# Patient Record
Sex: MALE | Race: WHITE | NOT HISPANIC OR LATINO | Employment: OTHER | URBAN - NONMETROPOLITAN AREA
[De-identification: names, ages, dates, MRNs, and addresses within clinical notes are randomized per-mention and may not be internally consistent; named-entity substitution may affect disease eponyms.]

---

## 2023-08-28 ENCOUNTER — PATIENT ROUNDING (BHMG ONLY) (OUTPATIENT)
Dept: CARDIOLOGY | Facility: CLINIC | Age: 53
End: 2023-08-28
Payer: COMMERCIAL

## 2023-08-28 ENCOUNTER — OFFICE VISIT (OUTPATIENT)
Dept: CARDIOLOGY | Facility: CLINIC | Age: 53
End: 2023-08-28
Payer: COMMERCIAL

## 2023-08-28 VITALS
BODY MASS INDEX: 40 KG/M2 | SYSTOLIC BLOOD PRESSURE: 102 MMHG | HEART RATE: 63 BPM | DIASTOLIC BLOOD PRESSURE: 58 MMHG | HEIGHT: 70 IN | WEIGHT: 279.4 LBS

## 2023-08-28 DIAGNOSIS — Z01.818 PREOPERATIVE CLEARANCE: ICD-10-CM

## 2023-08-28 DIAGNOSIS — Z99.89 OSA ON CPAP: ICD-10-CM

## 2023-08-28 DIAGNOSIS — I10 PRIMARY HYPERTENSION: Primary | ICD-10-CM

## 2023-08-28 DIAGNOSIS — R06.02 SHORTNESS OF BREATH: ICD-10-CM

## 2023-08-28 DIAGNOSIS — F17.200 SMOKING: ICD-10-CM

## 2023-08-28 DIAGNOSIS — R42 DIZZINESS: ICD-10-CM

## 2023-08-28 DIAGNOSIS — E11.69 TYPE 2 DIABETES MELLITUS WITH OTHER SPECIFIED COMPLICATION, WITHOUT LONG-TERM CURRENT USE OF INSULIN: ICD-10-CM

## 2023-08-28 DIAGNOSIS — Z82.49 FAMILY HISTORY OF PREMATURE CORONARY HEART DISEASE: ICD-10-CM

## 2023-08-28 DIAGNOSIS — E78.00 HYPERCHOLESTEROLEMIA: ICD-10-CM

## 2023-08-28 DIAGNOSIS — G47.33 OSA ON CPAP: ICD-10-CM

## 2023-08-28 DIAGNOSIS — R53.83 OTHER FATIGUE: ICD-10-CM

## 2023-08-28 PROBLEM — E11.9 DIABETES MELLITUS: Status: ACTIVE | Noted: 2023-08-28

## 2023-08-28 RX ORDER — ASPIRIN 325 MG
325 TABLET, DELAYED RELEASE (ENTERIC COATED) ORAL DAILY
COMMUNITY

## 2023-08-28 RX ORDER — ROPINIROLE 1 MG/1
1 TABLET, FILM COATED ORAL NIGHTLY
COMMUNITY

## 2023-08-28 RX ORDER — GLIPIZIDE 10 MG/1
10 TABLET ORAL
COMMUNITY

## 2023-08-28 RX ORDER — LOSARTAN POTASSIUM AND HYDROCHLOROTHIAZIDE 12.5; 1 MG/1; MG/1
1 TABLET ORAL DAILY
COMMUNITY

## 2023-08-28 RX ORDER — SIMVASTATIN 20 MG
20 TABLET ORAL NIGHTLY
COMMUNITY

## 2023-08-28 RX ORDER — TIRZEPATIDE 2.5 MG/.5ML
2.5 INJECTION, SOLUTION SUBCUTANEOUS WEEKLY
COMMUNITY

## 2023-08-28 NOTE — PROGRESS NOTES
August 28, 2023    [facility] may [facility] speak with Fercho Mas?    My name is Roxane Turner    I am  with [facility]  [facility]  [address] [place] [address] [phone].    Before we get started may I verify your date of birth? 1970    I am calling to officially welcome you to our practice and ask about your recent visit. Is this a good time to talk? yes    Tell me about your visit with us. What things went well?  everything was excellent       We're always looking for ways to make our patients' experiences even better. Do you have recommendations on ways we may improve?  no everything was good     Overall were you satisfied with your first visit to our practice? Yes ma'am       I appreciate you taking the time to speak with me today. Is there anything else I can do for you? No       Thank you, and have a great day.

## 2023-08-28 NOTE — PROGRESS NOTES
Subjective   Fercho Mas is a 52 y.o. male seen in the office today for initial cardiac consultation secondary to chest pain.     Recent symptoms include right arm pain. One episode of significant right arm pain prompted him to go to emergency department.  He had eaten about 5:00 that day and the pain happened around 11 PM.  He has history of gallbladder problems but surgeon felt that would not have contributed to his symptoms. Cardiac workup with labs and EKG were negative at that time.  Other symptoms include shortness of breath with exertion, episodes of dizziness most often occurring with position change, and significant increase in fatigue without cause.  PMH includes: type 2 diabetes, hypertension, cholesterolemia, COPD, H.pylori, no GI/ issues, umbilical hernia repair, YENI uses CPAP and follows with pulmonologist in [place]. PMH Negative for thyroid issues, CVA, prior cardiac issues.   In regards to diabetic management he started Mounjaro about 2 weeks ago and glucose per home monitor shows better control with glucose between .       FMH: 2 brothers prior MI around age 50, 1 sister MI, father had heart disease/DM  age 84.  Mother had diabetes.    Chief Complaint   Patient presents with    Establish Care     Referred per PCP for chest pain    Chest Pain     Patient had recent episode of pain at right arm and shoulder.    He is anticipating to have gall bladder removed and is need of Cardiac Clearance     Dizziness     Reports having dizziness , usually with bending and fast movement    LABS and TESTING     Labs May 2023 on chart  Has no cardiac testing       Initial cardiac evaluation;      Cardiac History  History reviewed. No pertinent surgical history.    Current Outpatient Medications   Medication Sig Dispense Refill    aspirin 325 MG EC tablet Take 1 tablet by mouth Daily.      Capsicum, Cayenne, (CAYENNE PEPPER PO) Take 400 mg by mouth Daily.      Cholecalciferol (Vitamin D-3) 125 MCG  (5000 UT) tablet Take 1 tablet by mouth Daily.      Cyanocobalamin (B-12 PO) Take 5,000 mg by mouth Daily.      glipizide (GLUCOTROL) 10 MG tablet Take 1 tablet by mouth 2 (Two) Times a Day Before Meals.      losartan-hydrochlorothiazide (HYZAAR) 100-12.5 MG per tablet Take 1 tablet by mouth Daily.      metFORMIN (GLUCOPHAGE) 500 MG tablet Take 2 tablets by mouth 2 (Two) Times a Day With Meals.      METOPROLOL TARTRATE PO Take 1 tablet by mouth 2 (Two) Times a Day.      Omega 3-6-9 Fatty Acids (OMEGA 3-6-9 PO) Take 2 capsules by mouth Daily.      Prasterone, DHEA, (DHEA PO) Take 150 mg by mouth Daily.      rOPINIRole (REQUIP) 1 MG tablet Take 1 tablet by mouth Every Night. Take 1 hour before bedtime.      simvastatin (ZOCOR) 20 MG tablet Take 1 tablet by mouth Every Night.      Tirzepatide (Mounjaro) 2.5 MG/0.5ML solution pen-injector Inject 0.5 mL under the skin into the appropriate area as directed 1 (One) Time Per Week.       No current facility-administered medications for this visit.       Patient has no known allergies.    Past Medical History:   Diagnosis Date    COPD (chronic obstructive pulmonary disease)     Deep vein thrombosis     Diabetes mellitus     Gall stones 2023    Hernia, abdominal     Hyperlipidemia     Hypertension     Restless legs     Sleep apnea        Social History     Socioeconomic History    Marital status:    Tobacco Use    Smoking status: Every Day     Packs/day: 2.00     Years: 44.00     Pack years: 88.00     Types: Cigarettes    Smokeless tobacco: Never   Vaping Use    Vaping Use: Never used   Substance and Sexual Activity    Alcohol use: Never    Drug use: Never       Family History   Problem Relation Age of Onset    Hypertension Father     Heart attack Father     Heart failure Father     Diabetes Father     Heart attack Sister     Diabetes Sister     Diabetes Brother     Heart attack Brother     Diabetes Brother     Heart attack Brother     Diabetes Paternal Grandmother   "      Review of Systems   Constitutional:  Positive for fatigue. Negative for activity change, diaphoresis and fever.   HENT: Negative.     Respiratory:  Positive for apnea (Uses CPAP with room air for YENI management) and shortness of breath.    Cardiovascular: Negative.    Gastrointestinal:  Negative for abdominal pain, blood in stool, constipation, diarrhea and nausea.        At times has issues with gallbladder, none in the last couple weeks   Endocrine: Positive for heat intolerance. Negative for polydipsia, polyphagia and polyuria.   Genitourinary: Negative.  Negative for difficulty urinating and hematuria.   Musculoskeletal:  Positive for arthralgias. Negative for gait problem and myalgias.   Skin: Negative.    Neurological:  Positive for dizziness. Negative for tremors, seizures, syncope, speech difficulty, weakness, light-headedness, numbness and headaches.   Hematological: Negative.    Psychiatric/Behavioral: Negative.       BP Readings from Last 5 Encounters:   08/28/23 102/58       Wt Readings from Last 5 Encounters:   08/28/23 127 kg (279 lb 6.4 oz)          Objective     Labs 5/11/2023: CK-MB 7.4, glucose 143, BUN 19, creatinine 1.28, GFR 59, sodium 139, potassium 4, chloride 108,, toxo 25, calcium 9.2, total protein 7.4, Beman 4.2, total bili 0.5, AST 23, ALT 31, ALP 52, A1c 8.2    Labs 1/9/2023: Total cholesterol 183, triglycerides 109, HDL 33,     /58 (BP Location: Right arm, Patient Position: Sitting)   Pulse 63   Ht 177.8 cm (70\")   Wt 127 kg (279 lb 6.4 oz)   BMI 40.09 kg/mý     Physical Exam  Constitutional:       Appearance: Normal appearance. He is obese.   HENT:      Head: Normocephalic.      Nose: Nose normal.      Mouth/Throat:      Pharynx: Oropharynx is clear.   Eyes:      Conjunctiva/sclera: Conjunctivae normal.      Pupils: Pupils are equal, round, and reactive to light.   Neck:      Vascular: No carotid bruit.   Cardiovascular:      Rate and Rhythm: Normal rate and " regular rhythm.   Pulmonary:      Effort: Pulmonary effort is normal.      Breath sounds: Decreased breath sounds present.   Chest:      Chest wall: No tenderness.   Abdominal:      General: There is no distension.      Palpations: Abdomen is soft.      Tenderness: There is no abdominal tenderness.   Musculoskeletal:      Cervical back: Neck supple. No tenderness.   Neurological:      Mental Status: He is alert.      Gait: Gait is intact.   Psychiatric:         Attention and Perception: Attention normal.         Mood and Affect: Mood normal.         Speech: Speech normal.         Cognition and Memory: Cognition normal.         ECG 12 Lead    Date/Time: 8/28/2023 11:16 AM  Performed by: Loida Russell APRN  Authorized by: Loida Russell APRN   Comparison: compared with previous ECG   Rhythm: sinus rhythm  BPM: 65        Assessment & Plan     Diagnoses and all orders for this visit:    1. Primary hypertension (Primary)  -     Stress Test With Myocardial Perfusion One Day; Future  -     Adult Transthoracic Echo Complete W/ Cont if Necessary Per Protocol; Future    2. Smoking  -     Stress Test With Myocardial Perfusion One Day; Future  -     Adult Transthoracic Echo Complete W/ Cont if Necessary Per Protocol; Future  -     US Carotid Bilateral; Future    3. Hypercholesterolemia  -     Stress Test With Myocardial Perfusion One Day; Future  -     Adult Transthoracic Echo Complete W/ Cont if Necessary Per Protocol; Future  -     US Carotid Bilateral; Future    4. Type 2 diabetes mellitus with other specified complication, without long-term current use of insulin  -     Stress Test With Myocardial Perfusion One Day; Future  -     Adult Transthoracic Echo Complete W/ Cont if Necessary Per Protocol; Future    5. Other fatigue  -     Stress Test With Myocardial Perfusion One Day; Future  -     Adult Transthoracic Echo Complete W/ Cont if Necessary Per Protocol; Future    6. Shortness of breath  -     Stress Test With  Myocardial Perfusion One Day; Future  -     Adult Transthoracic Echo Complete W/ Cont if Necessary Per Protocol; Future    7. Dizziness  -     US Carotid Bilateral; Future    8. YENI on CPAP  -     Stress Test With Myocardial Perfusion One Day; Future  -     Adult Transthoracic Echo Complete W/ Cont if Necessary Per Protocol; Future    9. Family history of premature coronary heart disease    10. Preoperative clearance    Other orders  -     ECG 12 Lead      Hypercholesterolemia  -HDL low,   -CK slightly elevated 7.4  -Continue simvastatin 20 mg nightly  -Consider changing to PCSK9 inhibitor or leqvio    Hypertension  -BP stable, heart rate and rhythm normal  -Electrolytes and renal function stable  -Continue Lopressor, Hyzaar    Diabetes  -A1c 8.2  -Recently started on Mounjaro and will follow with you for monitoring/management    YENI/COPD/smoking  -Compliant with CPAP  -We discussed smoking cessation for over 3 minutes.  Informational handout provided.  He may benefit in taking Wellbutrin.  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    Patient presents today with symptoms concerning for coronary ischemia including shortness of breath, fatigue, dizziness.  He has strong family history of premature ischemic heart disease.  He has significant risk factors including long-term smoking, hypercholesterolemia, hypertension, obesity, diabetes.  Due to knee pain and COPD we will proceed with nuclear stress test using Lexiscan as he does not feel he would be able to reach target heart rate on treadmill.  To look at overall cardiac status, valvular structure, PA pressure and echocardiogram also ordered.    Patient also anticipates need for cardiac clearance prior to cholecystectomy.  Further recommendations based on cardiac work-up results.    6-month follow-up visit scheduled.

## 2023-08-28 NOTE — LETTER
2023     MAMIE Romero  [address] [place] [address]    Patient: Fercho Mas   YOB: 1970   Date of Visit: 2023     Dear MAMIE Romero:       Thank you for referring Fercho Mas to me for evaluation. Below are the relevant portions of my assessment and plan of care.    If you have questions, please do not hesitate to call me. I look forward to following Fercho along with you.         Sincerely,        MAMIE Pimentel        CC: No Recipients    Loida Russell APRN  [phone]  Sign when Signing Visit  Subjective   Fercho Mas is a 52 y.o. male seen in the office today for initial cardiac consultation secondary to chest pain.     Recent symptoms include right arm pain. One episode of significant right arm pain prompted him to go to emergency department.  He had eaten about 5:00 that day and the pain happened around 11 PM.  He has history of gallbladder problems but surgeon felt that would not have contributed to his symptoms. Cardiac workup with labs and EKG were negative at that time.  Other symptoms include shortness of breath with exertion, episodes of dizziness most often occurring with position change, and significant increase in fatigue without cause.  PMH includes: type 2 diabetes, hypertension, cholesterolemia, COPD, H.pylori, no GI/ issues, umbilical hernia repair, YENI uses CPAP and follows with pulmonologist in [place]. PMH Negative for thyroid issues, CVA, prior cardiac issues.   In regards to diabetic management he started Mounjaro about 2 weeks ago and glucose per home monitor shows better control with glucose between .       FMH: 2 brothers prior MI around age 50, 1 sister MI, father had heart disease/DM  age 84.  Mother had diabetes.    Chief Complaint   Patient presents with    Establish Care     Referred per PCP for chest pain    Chest Pain     Patient had recent episode of pain at right arm and shoulder.    He  is anticipating to have gall bladder removed and is need of Cardiac Clearance     Dizziness     Reports having dizziness , usually with bending and fast movement    LABS and TESTING     Labs May 2023 on chart  Has no cardiac testing       Initial cardiac evaluation;      Cardiac History  History reviewed. No pertinent surgical history.    Current Outpatient Medications   Medication Sig Dispense Refill    aspirin 325 MG EC tablet Take 1 tablet by mouth Daily.      Capsicum, Cayenne, (CAYENNE PEPPER PO) Take 400 mg by mouth Daily.      Cholecalciferol (Vitamin D-3) 125 MCG (5000 UT) tablet Take 1 tablet by mouth Daily.      Cyanocobalamin (B-12 PO) Take 5,000 mg by mouth Daily.      glipizide (GLUCOTROL) 10 MG tablet Take 1 tablet by mouth 2 (Two) Times a Day Before Meals.      losartan-hydrochlorothiazide (HYZAAR) 100-12.5 MG per tablet Take 1 tablet by mouth Daily.      metFORMIN (GLUCOPHAGE) 500 MG tablet Take 2 tablets by mouth 2 (Two) Times a Day With Meals.      METOPROLOL TARTRATE PO Take 1 tablet by mouth 2 (Two) Times a Day.      Omega 3-6-9 Fatty Acids (OMEGA 3-6-9 PO) Take 2 capsules by mouth Daily.      Prasterone, DHEA, (DHEA PO) Take 150 mg by mouth Daily.      rOPINIRole (REQUIP) 1 MG tablet Take 1 tablet by mouth Every Night. Take 1 hour before bedtime.      simvastatin (ZOCOR) 20 MG tablet Take 1 tablet by mouth Every Night.      Tirzepatide (Mounjaro) 2.5 MG/0.5ML solution pen-injector Inject 0.5 mL under the skin into the appropriate area as directed 1 (One) Time Per Week.       No current facility-administered medications for this visit.       Patient has no known allergies.    Past Medical History:   Diagnosis Date    COPD (chronic obstructive pulmonary disease)     Deep vein thrombosis     Diabetes mellitus     Gall stones 2023    Hernia, abdominal     Hyperlipidemia     Hypertension     Restless legs     Sleep apnea        Social History     Socioeconomic History     Marital status:    Tobacco Use    Smoking status: Every Day     Packs/day: 2.00     Years: 44.00     Pack years: 88.00     Types: Cigarettes    Smokeless tobacco: Never   Vaping Use    Vaping Use: Never used   Substance and Sexual Activity    Alcohol use: Never    Drug use: Never       Family History   Problem Relation Age of Onset    Hypertension Father     Heart attack Father     Heart failure Father     Diabetes Father     Heart attack Sister     Diabetes Sister     Diabetes Brother     Heart attack Brother     Diabetes Brother     Heart attack Brother     Diabetes Paternal Grandmother        Review of Systems   Constitutional:  Positive for fatigue. Negative for activity change, diaphoresis and fever.   HENT: Negative.     Respiratory:  Positive for apnea (Uses CPAP with room air for YENI management) and shortness of breath.    Cardiovascular: Negative.    Gastrointestinal:  Negative for abdominal pain, blood in stool, constipation, diarrhea and nausea.        At times has issues with gallbladder, none in the last couple weeks   Endocrine: Positive for heat intolerance. Negative for polydipsia, polyphagia and polyuria.   Genitourinary: Negative.  Negative for difficulty urinating and hematuria.   Musculoskeletal:  Positive for arthralgias. Negative for gait problem and myalgias.   Skin: Negative.    Neurological:  Positive for dizziness. Negative for tremors, seizures, syncope, speech difficulty, weakness, light-headedness, numbness and headaches.   Hematological: Negative.    Psychiatric/Behavioral: Negative.       BP Readings from Last 5 Encounters:   08/28/23 102/58       Wt Readings from Last 5 Encounters:   08/28/23 127 kg (279 lb 6.4 oz)          Objective     Labs 5/11/2023: CK-MB 7.4, glucose 143, BUN 19, creatinine 1.28, GFR 59, sodium 139, potassium 4, chloride 108,, toxo 25, calcium 9.2, total protein 7.4, Beman 4.2, total bili 0.5, AST 23, ALT 31, ALP 52, A1c 8.2    Labs  "1/9/2023: Total cholesterol 183, triglycerides 109, HDL 33,     /58 (BP Location: Right arm, Patient Position: Sitting)   Pulse 63   Ht 177.8 cm (70\")   Wt 127 kg (279 lb 6.4 oz)   BMI 40.09 kg/mý     Physical Exam  Constitutional:       Appearance: Normal appearance. He is obese.   HENT:      Head: Normocephalic.      Nose: Nose normal.      Mouth/Throat:      Pharynx: Oropharynx is clear.   Eyes:      Conjunctiva/sclera: Conjunctivae normal.      Pupils: Pupils are equal, round, and reactive to light.   Neck:      Vascular: No carotid bruit.   Cardiovascular:      Rate and Rhythm: Normal rate and regular rhythm.   Pulmonary:      Effort: Pulmonary effort is normal.      Breath sounds: Decreased breath sounds present.   Chest:      Chest wall: No tenderness.   Abdominal:      General: There is no distension.      Palpations: Abdomen is soft.      Tenderness: There is no abdominal tenderness.   Musculoskeletal:      Cervical back: Neck supple. No tenderness.   Neurological:      Mental Status: He is alert.      Gait: Gait is intact.   Psychiatric:         Attention and Perception: Attention normal.         Mood and Affect: Mood normal.         Speech: Speech normal.         Cognition and Memory: Cognition normal.         ECG 12 Lead    Date/Time: 8/28/2023 11:16 AM  Performed by: Loida Russell APRN  Authorized by: Loida Russell APRN   Comparison: compared with previous ECG   Rhythm: sinus rhythm  BPM: 65        Assessment & Plan     Diagnoses and all orders for this visit:    1. Primary hypertension (Primary)  -     Stress Test With Myocardial Perfusion One Day; Future  -     Adult Transthoracic Echo Complete W/ Cont if Necessary Per Protocol; Future    2. Smoking  -     Stress Test With Myocardial Perfusion One Day; Future  -     Adult Transthoracic Echo Complete W/ Cont if Necessary Per Protocol; Future  -     US Carotid Bilateral; Future    3. Hypercholesterolemia  -     Stress Test With " Myocardial Perfusion One Day; Future  -     Adult Transthoracic Echo Complete W/ Cont if Necessary Per Protocol; Future  -     US Carotid Bilateral; Future    4. Type 2 diabetes mellitus with other specified complication, without long-term current use of insulin  -     Stress Test With Myocardial Perfusion One Day; Future  -     Adult Transthoracic Echo Complete W/ Cont if Necessary Per Protocol; Future    5. Other fatigue  -     Stress Test With Myocardial Perfusion One Day; Future  -     Adult Transthoracic Echo Complete W/ Cont if Necessary Per Protocol; Future    6. Shortness of breath  -     Stress Test With Myocardial Perfusion One Day; Future  -     Adult Transthoracic Echo Complete W/ Cont if Necessary Per Protocol; Future    7. Dizziness  -     US Carotid Bilateral; Future    8. YENI on CPAP  -     Stress Test With Myocardial Perfusion One Day; Future  -     Adult Transthoracic Echo Complete W/ Cont if Necessary Per Protocol; Future    9. Family history of premature coronary heart disease    10. Preoperative clearance    Other orders  -     ECG 12 Lead      Hypercholesterolemia  -HDL low,   -CK slightly elevated 7.4  -Continue simvastatin 20 mg nightly  -Consider changing to PCSK9 inhibitor or leqvio    Hypertension  -BP stable, heart rate and rhythm normal  -Electrolytes and renal function stable  -Continue Lopressor, Hyzaar    Diabetes  -A1c 8.2  -Recently started on Mounjaro and will follow with you for monitoring/management    YENI/COPD/smoking  -Compliant with CPAP  -We discussed smoking cessation for over 3 minutes.  Informational handout provided.  He may benefit in taking Wellbutrin.  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    Patient presents today with symptoms concerning for coronary ischemia including shortness of breath, fatigue, dizziness.  He has strong family history of premature ischemic heart disease.  He has significant  risk factors including long-term smoking, hypercholesterolemia, hypertension, obesity, diabetes.  Due to knee pain and COPD we will proceed with nuclear stress test using Lexiscan as he does not feel he would be able to reach target heart rate on treadmill.  To look at overall cardiac status, valvular structure, PA pressure and echocardiogram also ordered.    Patient also anticipates need for cardiac clearance prior to cholecystectomy.  Further recommendations based on cardiac work-up results.    6-month follow-up visit scheduled.

## 2023-09-14 ENCOUNTER — HOSPITAL ENCOUNTER (OUTPATIENT)
Dept: CARDIOLOGY | Facility: HOSPITAL | Age: 53
Discharge: HOME | End: 2023-09-14
Payer: COMMERCIAL

## 2023-09-14 DIAGNOSIS — R53.83 OTHER FATIGUE: ICD-10-CM

## 2023-09-14 DIAGNOSIS — G47.33 OSA ON CPAP: ICD-10-CM

## 2023-09-14 DIAGNOSIS — E78.00 HYPERCHOLESTEROLEMIA: ICD-10-CM

## 2023-09-14 DIAGNOSIS — F17.200 SMOKING: ICD-10-CM

## 2023-09-14 DIAGNOSIS — I10 PRIMARY HYPERTENSION: ICD-10-CM

## 2023-09-14 DIAGNOSIS — R06.02 SHORTNESS OF BREATH: ICD-10-CM

## 2023-09-14 DIAGNOSIS — E11.69 TYPE 2 DIABETES MELLITUS WITH OTHER SPECIFIED COMPLICATION, WITHOUT LONG-TERM CURRENT USE OF INSULIN: ICD-10-CM

## 2023-09-14 DIAGNOSIS — R94.39 ABNORMAL STRESS TEST: Primary | ICD-10-CM

## 2023-09-14 DIAGNOSIS — Z99.89 OSA ON CPAP: ICD-10-CM

## 2023-09-14 DIAGNOSIS — R42 DIZZINESS: ICD-10-CM

## 2023-09-14 LAB
BH CV ECHO MEAS - ACS: 2.5 CM
BH CV ECHO MEAS - AO MAX PG: 8.6 MMHG
BH CV ECHO MEAS - AO MEAN PG: 5 MMHG
BH CV ECHO MEAS - AO ROOT DIAM: 3.2 CM
BH CV ECHO MEAS - AO V2 MAX: 146.8 CM/SEC
BH CV ECHO MEAS - AO V2 VTI: 32.9 CM
BH CV ECHO MEAS - AVA(I,D): 2.9 CM2
BH CV ECHO MEAS - EDV(CUBED): 169.1 ML
BH CV ECHO MEAS - EDV(MOD-SP4): 121 ML
BH CV ECHO MEAS - EF(MOD-SP4): 50.2 %
BH CV ECHO MEAS - EF_3D-VOL: 53 %
BH CV ECHO MEAS - ESV(CUBED): 54 ML
BH CV ECHO MEAS - ESV(MOD-SP4): 60.3 ML
BH CV ECHO MEAS - FS: 31.6 %
BH CV ECHO MEAS - IVS/LVPW: 1.01 CM
BH CV ECHO MEAS - IVSD: 1.35 CM
BH CV ECHO MEAS - LA DIMENSION: 3.9 CM
BH CV ECHO MEAS - LAT PEAK E' VEL: 10.6 CM/SEC
BH CV ECHO MEAS - LV DIASTOLIC VOL/BSA (35-75): 50.3 CM2
BH CV ECHO MEAS - LV MASS(C)D: 322 GRAMS
BH CV ECHO MEAS - LV MAX PG: 2.41 MMHG
BH CV ECHO MEAS - LV MEAN PG: 1 MMHG
BH CV ECHO MEAS - LV SYSTOLIC VOL/BSA (12-30): 25.1 CM2
BH CV ECHO MEAS - LV V1 MAX: 77.6 CM/SEC
BH CV ECHO MEAS - LV V1 VTI: 16.5 CM
BH CV ECHO MEAS - LVIDD: 5.5 CM
BH CV ECHO MEAS - LVIDS: 3.8 CM
BH CV ECHO MEAS - LVOT AREA: 5.7 CM2
BH CV ECHO MEAS - LVOT DIAM: 2.7 CM
BH CV ECHO MEAS - LVPWD: 1.34 CM
BH CV ECHO MEAS - MED PEAK E' VEL: 7.4 CM/SEC
BH CV ECHO MEAS - MV A MAX VEL: 73.7 CM/SEC
BH CV ECHO MEAS - MV DEC TIME: 0.17 MSEC
BH CV ECHO MEAS - MV E MAX VEL: 98.1 CM/SEC
BH CV ECHO MEAS - MV E/A: 1.33
BH CV ECHO MEAS - SI(MOD-SP4): 25.2 ML/M2
BH CV ECHO MEAS - SV(LVOT): 94.5 ML
BH CV ECHO MEAS - SV(MOD-SP4): 60.7 ML
BH CV ECHO MEASUREMENTS AVERAGE E/E' RATIO: 10.9
BH CV REST NUCLEAR ISOTOPE DOSE: 10 MCI
BH CV STRESS NUCLEAR ISOTOPE DOSE: 30 MCI
BH CV XLRA - RV BASE: 3.6 CM
BH CV XLRA - RV LENGTH: 9.2 CM
BH CV XLRA - RV MID: 3.6 CM
LEFT ATRIUM VOLUME INDEX: 24.3 ML/M2
LV EF NUC BP: 46 %

## 2023-09-14 PROCEDURE — 0 TECHNETIUM SESTAMIBI: Performed by: INTERNAL MEDICINE

## 2023-09-14 PROCEDURE — 93017 CV STRESS TEST TRACING ONLY: CPT

## 2023-09-14 PROCEDURE — 93306 TTE W/DOPPLER COMPLETE: CPT

## 2023-09-14 PROCEDURE — 93880 EXTRACRANIAL BILAT STUDY: CPT

## 2023-09-14 PROCEDURE — A9500 TC99M SESTAMIBI: HCPCS | Performed by: INTERNAL MEDICINE

## 2023-09-14 PROCEDURE — 25010000002 REGADENOSON 0.4 MG/5ML SOLUTION: Performed by: INTERNAL MEDICINE

## 2023-09-14 PROCEDURE — 78452 HT MUSCLE IMAGE SPECT MULT: CPT

## 2023-09-14 RX ORDER — REGADENOSON 0.08 MG/ML
0.4 INJECTION, SOLUTION INTRAVENOUS
Status: COMPLETED | OUTPATIENT
Start: 2023-09-14 | End: 2023-09-14

## 2023-09-14 RX ORDER — ISOSORBIDE MONONITRATE 30 MG/1
30 TABLET, EXTENDED RELEASE ORAL EVERY MORNING
Qty: 30 TABLET | Refills: 11 | Status: SHIPPED | OUTPATIENT
Start: 2023-09-14

## 2023-09-14 RX ADMIN — REGADENOSON 0.4 MG: 0.08 INJECTION, SOLUTION INTRAVENOUS at 10:43

## 2023-09-14 RX ADMIN — TECHNETIUM TC 99M SESTAMIBI 1 DOSE: 1 INJECTION INTRAVENOUS at 09:01

## 2023-09-14 RX ADMIN — TECHNETIUM TC 99M SESTAMIBI 1 DOSE: 1 INJECTION INTRAVENOUS at 10:43

## 2023-09-20 ENCOUNTER — TELEPHONE (OUTPATIENT)
Dept: CARDIOLOGY | Facility: CLINIC | Age: 53
End: 2023-09-20
Payer: COMMERCIAL

## 2023-09-20 NOTE — TELEPHONE ENCOUNTER
I am working on scheduling patient's cardiac catheterization on 10/12/2023. He completely quit smoking 3 days ago.    He is requesting his cath be done radial to have shorter down time due to work.

## 2023-10-12 ENCOUNTER — OUTSIDE FACILITY SERVICE (OUTPATIENT)
Dept: CARDIOLOGY | Facility: CLINIC | Age: 53
End: 2023-10-12
Payer: COMMERCIAL

## 2023-10-12 PROCEDURE — 93458 L HRT ARTERY/VENTRICLE ANGIO: CPT | Performed by: INTERNAL MEDICINE

## 2023-11-08 ENCOUNTER — TELEPHONE (OUTPATIENT)
Dept: CARDIOLOGY | Facility: CLINIC | Age: 53
End: 2023-11-08
Payer: COMMERCIAL

## 2023-11-08 NOTE — TELEPHONE ENCOUNTER
Received fax from Dr. Thapa for cardiac clearance for patient to have a lap jaci. Patient is on aspirin, unclear if needing to hold. According to our records, I do not see where patient has had any stenting.          Fax [phone]

## 2023-12-07 ENCOUNTER — OFFICE VISIT (OUTPATIENT)
Dept: CARDIOLOGY | Facility: CLINIC | Age: 53
End: 2023-12-07
Payer: COMMERCIAL

## 2023-12-07 VITALS
HEART RATE: 78 BPM | WEIGHT: 276 LBS | DIASTOLIC BLOOD PRESSURE: 64 MMHG | SYSTOLIC BLOOD PRESSURE: 104 MMHG | BODY MASS INDEX: 39.51 KG/M2 | HEIGHT: 70 IN

## 2023-12-07 DIAGNOSIS — I10 PRIMARY HYPERTENSION: Primary | ICD-10-CM

## 2023-12-07 DIAGNOSIS — F17.200 SMOKING: ICD-10-CM

## 2023-12-07 DIAGNOSIS — I42.0 CARDIOMYOPATHY, DILATED: ICD-10-CM

## 2023-12-07 DIAGNOSIS — E11.9 TYPE 2 DIABETES MELLITUS WITHOUT COMPLICATION, WITHOUT LONG-TERM CURRENT USE OF INSULIN: ICD-10-CM

## 2023-12-07 DIAGNOSIS — E66.01 MORBID OBESITY WITH BMI OF 40.0-44.9, ADULT: ICD-10-CM

## 2023-12-07 DIAGNOSIS — E78.00 HYPERCHOLESTEROLEMIA: ICD-10-CM

## 2023-12-07 DIAGNOSIS — I51.9 SYSTOLIC DYSFUNCTION: ICD-10-CM

## 2023-12-07 RX ORDER — ASPIRIN 81 MG/1
81 TABLET ORAL DAILY
Qty: 90 TABLET | Refills: 3 | Status: SHIPPED | OUTPATIENT
Start: 2023-12-07

## 2023-12-07 RX ORDER — SPIRONOLACTONE 25 MG/1
1 TABLET ORAL DAILY
COMMUNITY
Start: 2023-11-20 | End: 2023-12-07 | Stop reason: SDUPTHER

## 2023-12-07 RX ORDER — METOPROLOL TARTRATE 50 MG/1
50 TABLET, FILM COATED ORAL 2 TIMES DAILY
Qty: 180 TABLET | Refills: 3 | Status: SHIPPED | OUTPATIENT
Start: 2023-12-07

## 2023-12-07 RX ORDER — SPIRONOLACTONE 25 MG/1
25 TABLET ORAL DAILY
Qty: 90 TABLET | Refills: 3 | Status: SHIPPED | OUTPATIENT
Start: 2023-12-07

## 2023-12-07 RX ORDER — SACUBITRIL AND VALSARTAN 49; 51 MG/1; MG/1
1 TABLET, FILM COATED ORAL EVERY 12 HOURS SCHEDULED
Qty: 180 TABLET | Refills: 3 | Status: SHIPPED | OUTPATIENT
Start: 2023-12-07

## 2023-12-07 RX ORDER — SACUBITRIL AND VALSARTAN 49; 51 MG/1; MG/1
1 TABLET, FILM COATED ORAL EVERY 12 HOURS SCHEDULED
COMMUNITY
Start: 2023-11-20 | End: 2023-12-07 | Stop reason: SDUPTHER

## 2023-12-07 RX ORDER — VENLAFAXINE 50 MG/1
50 TABLET ORAL 2 TIMES DAILY WITH MEALS
COMMUNITY
Start: 2023-11-17

## 2023-12-07 RX ORDER — ROSUVASTATIN CALCIUM 20 MG/1
1 TABLET, COATED ORAL DAILY
COMMUNITY
Start: 2023-11-20 | End: 2023-12-07 | Stop reason: SDUPTHER

## 2023-12-07 RX ORDER — ROSUVASTATIN CALCIUM 20 MG/1
20 TABLET, COATED ORAL DAILY
Qty: 90 TABLET | Refills: 3 | Status: SHIPPED | OUTPATIENT
Start: 2023-12-07

## 2023-12-07 NOTE — LETTER
December 7, 2023       No Recipients    Patient: Fercho Mas   YOB: 1970   Date of Visit: 12/7/2023     Dear MAMIE Romero:       Thank you for referring Fercho Mas to me for evaluation. Below are the relevant portions of my assessment and plan of care.    If you have questions, please do not hesitate to call me. I look forward to following Fercho along with you.         Sincerely,        Miki Padron MD        CC:   No Recipients    Miki Padron MD  12/07/23 1258  Sign when Signing Visit  Chief Complaint   Patient presents with   • Follow-up     Pt is here for cardiac/hospital  follow up.  Pt states he is feeling better after medication changes and his cath.  He denies CP, SOB, dizziness or palpitations at this time.  Pt does take a daily ASA.     • Med Refill     Pt request 90 day refills to be sent to [facility] #2.  Medications were verified by the pt.     • Lab Work     Pt states their last labs were 10/12/23 at [facility].        CARDIAC COMPLAINTS  dyspnea        Subjective  Fercho Mas is a 53 y.o. male came in today for his hospital follow-up visit.  He has history of hypertension, diabetes, hypercholesterolemia who was referred for chest pain and shortness of breath.  He underwent an echocardiogram which shows left ventricular function was slightly diminished and wall motion abnormality involving the apex and the septum.  His stress test shows apical infarct with ischemia.  He is carotid ultrasound showed no significant stenosis.  He then underwent cardiac catheterization which showed normal coronaries, mild LV dysfunction.  He had some changes with the medication.  Hyzaar was changed to Entresto.  Aldactone was added.  He also was told to use his CPAP more regularly.  He came today stating that he is feeling better than before.  His shortness of breath is still there but less than what it was.  He does take his aspirin regularly.  His blood sugars still running  little high.              Cardiac History  Past Surgical History:   Procedure Laterality Date   • CARDIAC CATHETERIZATION  10/12/2023    Normal Coronaries. EF 45%   • CARDIOVASCULAR STRESS TEST  09/14/2023    Lexiscan- EF 46%. Apical Infarct with Ischemia   • ECHO - CONVERTED  09/14/2023    TLS. EF 45-50%. Apical WMA. LA- 3.9. Trace-Mild MR   • US CAROTID UNILATERAL  09/14/2023    No sig lesions       Current Outpatient Medications   Medication Sig Dispense Refill   • Capsicum, Cayenne, (CAYENNE PEPPER PO) Take 400 mg by mouth Daily.     • Cholecalciferol (Vitamin D-3) 125 MCG (5000 UT) tablet Take 1 tablet by mouth Daily.     • Cyanocobalamin (B-12 PO) Take 5,000 mg by mouth Daily.     • Entresto 49-51 MG tablet Take 1 tablet by mouth Every 12 (Twelve) Hours. 180 tablet 3   • glipizide (GLUCOTROL) 10 MG tablet Take 1 tablet by mouth 2 (Two) Times a Day Before Meals.     • metFORMIN (GLUCOPHAGE) 500 MG tablet Take 2 tablets by mouth 2 (Two) Times a Day With Meals.     • metoprolol tartrate (LOPRESSOR) 50 MG tablet Take 1 tablet by mouth 2 (Two) Times a Day. 180 tablet 3   • Omega 3-6-9 Fatty Acids (OMEGA 3-6-9 PO) Take 2 capsules by mouth Daily.     • Prasterone, DHEA, (DHEA PO) Take 150 mg by mouth Daily.     • rOPINIRole (REQUIP) 1 MG tablet Take 1 tablet by mouth Every Night. Take 1 hour before bedtime.     • rosuvastatin (CRESTOR) 20 MG tablet Take 1 tablet by mouth Daily. 90 tablet 3   • spironolactone (ALDACTONE) 25 MG tablet Take 1 tablet by mouth Daily. 90 tablet 3   • Tirzepatide (Mounjaro) 2.5 MG/0.5ML solution pen-injector Inject 0.5 mL under the skin into the appropriate area as directed 1 (One) Time Per Week.     • venlafaxine (EFFEXOR) 50 MG tablet Take 1 tablet by mouth 2 (Two) Times a Day With Meals.     • aspirin 81 MG EC tablet Take 1 tablet by mouth Daily. 90 tablet 3   • dapagliflozin Propanediol 10 MG tablet Take 10 mg by mouth Daily. 90 tablet 3     No current facility-administered medications  for this visit.       Allergies  :  Patient has no known allergies.       Past Medical History:   Diagnosis Date   • COPD (chronic obstructive pulmonary disease)    • Deep vein thrombosis    • Diabetes mellitus    • Gall stones 2023   • Hernia, abdominal    • Hyperlipidemia    • Hypertension    • Restless legs    • Sleep apnea        Social History     Socioeconomic History   • Marital status:    Tobacco Use   • Smoking status: Every Day     Packs/day: 1.50     Years: 44.00     Additional pack years: 0.00     Total pack years: 66.00     Types: Cigarettes   • Smokeless tobacco: Never   Vaping Use   • Vaping Use: Never used   Substance and Sexual Activity   • Alcohol use: Never   • Drug use: Never       Family History   Problem Relation Age of Onset   • Hypertension Father    • Heart attack Father    • Heart failure Father    • Diabetes Father    • Heart attack Sister    • Diabetes Sister    • Diabetes Brother    • Heart attack Brother    • Diabetes Brother    • Heart attack Brother    • Diabetes Paternal Grandmother        Review of Systems   Constitutional: Negative for decreased appetite and malaise/fatigue.   HENT:  Negative for congestion and sore throat.    Eyes:  Negative for blurred vision, double vision and visual disturbance.   Cardiovascular:  Positive for dyspnea on exertion. Negative for chest pain.   Respiratory:  Positive for shortness of breath. Negative for snoring.    Endocrine: Negative for cold intolerance and heat intolerance.   Hematologic/Lymphatic: Negative for adenopathy. Does not bruise/bleed easily.   Skin:  Negative for itching, nail changes and skin cancer.   Musculoskeletal:  Negative for arthritis and myalgias.   Gastrointestinal:  Negative for abdominal pain, dysphagia and heartburn.   Genitourinary:  Negative for bladder incontinence and frequency.   Neurological:  Negative for dizziness, seizures and vertigo.   Psychiatric/Behavioral:  Negative for altered mental status.   "  Allergic/Immunologic: Negative for environmental allergies and hives.     Diabetes- Yes  Thyroid- normal    Objective    /64 (BP Location: Left arm, Patient Position: Sitting)   Pulse 78   Ht 177.8 cm (70\")   Wt 125 kg (276 lb)   BMI 39.60 kg/m²     Vitals and nursing note reviewed.   Constitutional:       Appearance: Healthy appearance. Not in distress.   Eyes:      Conjunctiva/sclera: Conjunctivae normal.      Pupils: Pupils are equal, round, and reactive to light.   HENT:      Head: Normocephalic.   Pulmonary:      Effort: Pulmonary effort is normal.      Breath sounds: Normal breath sounds.   Cardiovascular:      PMI at left midclavicular line. Normal rate. Regular rhythm.   Abdominal:      General: Bowel sounds are normal.      Palpations: Abdomen is soft.   Musculoskeletal: Normal range of motion.      Cervical back: Normal range of motion and neck supple. Skin:     General: Skin is warm and dry.   Neurological:      Mental Status: Alert, oriented to person, place, and time and oriented to person, place and time.   Procedures            @ASSESSMENT/PLAN@  Class 2 Severe Obesity (BMI >=35 and <=39.9). Obesity-related health conditions include the following: hypertension, diabetes mellitus, GERD, and lower extremity venous stasis disease. Obesity is unchanged. BMI is is above average; BMI management plan is completed. We discussed low calorie, low carb based diet program, portion control, and increasing exercise.     Diagnoses and all orders for this visit:    1. Primary hypertension (Primary)  -     Entresto 49-51 MG tablet; Take 1 tablet by mouth Every 12 (Twelve) Hours.  Dispense: 180 tablet; Refill: 3  -     metoprolol tartrate (LOPRESSOR) 50 MG tablet; Take 1 tablet by mouth 2 (Two) Times a Day.  Dispense: 180 tablet; Refill: 3  -     spironolactone (ALDACTONE) 25 MG tablet; Take 1 tablet by mouth Daily.  Dispense: 90 tablet; Refill: 3  -     Comprehensive Metabolic Panel; Future    2. " Hypercholesterolemia  -     aspirin 81 MG EC tablet; Take 1 tablet by mouth Daily.  Dispense: 90 tablet; Refill: 3  -     rosuvastatin (CRESTOR) 20 MG tablet; Take 1 tablet by mouth Daily.  Dispense: 90 tablet; Refill: 3  -     Lipid Panel; Future    3. Smoking    4. Systolic dysfunction  -     Entresto 49-51 MG tablet; Take 1 tablet by mouth Every 12 (Twelve) Hours.  Dispense: 180 tablet; Refill: 3  -     dapagliflozin Propanediol 10 MG tablet; Take 10 mg by mouth Daily.  Dispense: 90 tablet; Refill: 3  -     proBNP; Future    5. Cardiomyopathy, dilated  -     aspirin 81 MG EC tablet; Take 1 tablet by mouth Daily.  Dispense: 90 tablet; Refill: 3  -     Entresto 49-51 MG tablet; Take 1 tablet by mouth Every 12 (Twelve) Hours.  Dispense: 180 tablet; Refill: 3  -     metoprolol tartrate (LOPRESSOR) 50 MG tablet; Take 1 tablet by mouth 2 (Two) Times a Day.  Dispense: 180 tablet; Refill: 3  -     spironolactone (ALDACTONE) 25 MG tablet; Take 1 tablet by mouth Daily.  Dispense: 90 tablet; Refill: 3  -     dapagliflozin Propanediol 10 MG tablet; Take 10 mg by mouth Daily.  Dispense: 90 tablet; Refill: 3  -     TSH; Future  -     proBNP; Future    6. Morbid obesity with BMI of 40.0-44.9, adult  -     CBC & Differential; Future    7. Type 2 diabetes mellitus without complication, without long-term current use of insulin  -     dapagliflozin Propanediol 10 MG tablet; Take 10 mg by mouth Daily.  Dispense: 90 tablet; Refill: 3  -     Hemoglobin A1c; Future  -     TSH; Future       At baseline his heart rate is stable.  His blood pressure is normal.  His BMI is still around 40.  His clinical examination is otherwise unremarkable    Regarding his hypertension, it seems to be very well-controlled with a combination of Entresto, beta-blockers and Aldactone.  Continue the same.  He is advised to recheck his lab especially the potassium level and the renal function    Regarding his hypercholesterolemia, his LDL was still slightly  elevated on Crestor dose was increased.  Need to recheck his profile and LFT    Regarding the systolic dysfunction and cardiomyopathy.  At this time we will continue the Entresto, beta-blockers and Aldactone.  I did add Farxiga 10 mg once a day.  He is advised to check his BNP level and if it is elevated will add low-dose of diuretic    Regarding his smoking, talked to him again about the smoking.  He is trying his best to quit smoking.  I gave him papers to help him quit    Regarding the obesity, he does have sleep apnea and is using the CPAP more regularly.  I talked to him about diet and gave him papers on Mediterranean diet    Regarding his diabetes, he is on metformin and Glucotrol.  His blood sugar is still slightly elevated.  Hopefully the Farxiga will help reduce it.  Recheck his A1c level    I will see him back in 6 months or sooner if needed                  Electronically signed by Miki Padron MD December 7, 2023 12:58 EST

## 2023-12-07 NOTE — PROGRESS NOTES
Chief Complaint   Patient presents with   • Follow-up     Pt is here for cardiac/hospital  follow up.  Pt states he is feeling better after medication changes and his cath.  He denies CP, SOB, dizziness or palpitations at this time.  Pt does take a daily ASA.     • Med Refill     Pt request 90 day refills to be sent to [facility] #2.  Medications were verified by the pt.     • Lab Work     Pt states their last labs were 10/12/23 at [facility].        CARDIAC COMPLAINTS  dyspnea        Subjective   Fercho Mas is a 53 y.o. male came in today for his hospital follow-up visit.  He has history of hypertension, diabetes, hypercholesterolemia who was referred for chest pain and shortness of breath.  He underwent an echocardiogram which shows left ventricular function was slightly diminished and wall motion abnormality involving the apex and the septum.  His stress test shows apical infarct with ischemia.  He is carotid ultrasound showed no significant stenosis.  He then underwent cardiac catheterization which showed normal coronaries, mild LV dysfunction.  He had some changes with the medication.  Hyzaar was changed to Entresto.  Aldactone was added.  He also was told to use his CPAP more regularly.  He came today stating that he is feeling better than before.  His shortness of breath is still there but less than what it was.  He does take his aspirin regularly.  His blood sugars still running little high.              Cardiac History  Past Surgical History:   Procedure Laterality Date   • CARDIAC CATHETERIZATION  10/12/2023    Normal Coronaries. EF 45%   • CARDIOVASCULAR STRESS TEST  09/14/2023    Lexiscan- EF 46%. Apical Infarct with Ischemia   • ECHO - CONVERTED  09/14/2023    TLS. EF 45-50%. Apical WMA. LA- 3.9. Trace-Mild MR   • US CAROTID UNILATERAL  09/14/2023    No sig lesions       Current Outpatient Medications   Medication Sig Dispense Refill   • Capsicum, Cayenne, (CAYENNE PEPPER PO) Take 400 mg by mouth  Daily.     • Cholecalciferol (Vitamin D-3) 125 MCG (5000 UT) tablet Take 1 tablet by mouth Daily.     • Cyanocobalamin (B-12 PO) Take 5,000 mg by mouth Daily.     • Entresto 49-51 MG tablet Take 1 tablet by mouth Every 12 (Twelve) Hours. 180 tablet 3   • glipizide (GLUCOTROL) 10 MG tablet Take 1 tablet by mouth 2 (Two) Times a Day Before Meals.     • metFORMIN (GLUCOPHAGE) 500 MG tablet Take 2 tablets by mouth 2 (Two) Times a Day With Meals.     • metoprolol tartrate (LOPRESSOR) 50 MG tablet Take 1 tablet by mouth 2 (Two) Times a Day. 180 tablet 3   • Omega 3-6-9 Fatty Acids (OMEGA 3-6-9 PO) Take 2 capsules by mouth Daily.     • Prasterone, DHEA, (DHEA PO) Take 150 mg by mouth Daily.     • rOPINIRole (REQUIP) 1 MG tablet Take 1 tablet by mouth Every Night. Take 1 hour before bedtime.     • rosuvastatin (CRESTOR) 20 MG tablet Take 1 tablet by mouth Daily. 90 tablet 3   • spironolactone (ALDACTONE) 25 MG tablet Take 1 tablet by mouth Daily. 90 tablet 3   • Tirzepatide (Mounjaro) 2.5 MG/0.5ML solution pen-injector Inject 0.5 mL under the skin into the appropriate area as directed 1 (One) Time Per Week.     • venlafaxine (EFFEXOR) 50 MG tablet Take 1 tablet by mouth 2 (Two) Times a Day With Meals.     • aspirin 81 MG EC tablet Take 1 tablet by mouth Daily. 90 tablet 3   • dapagliflozin Propanediol 10 MG tablet Take 10 mg by mouth Daily. 90 tablet 3     No current facility-administered medications for this visit.       Allergies  :  Patient has no known allergies.       Past Medical History:   Diagnosis Date   • COPD (chronic obstructive pulmonary disease)    • Deep vein thrombosis    • Diabetes mellitus    • Gall stones 2023   • Hernia, abdominal    • Hyperlipidemia    • Hypertension    • Restless legs    • Sleep apnea        Social History     Socioeconomic History   • Marital status:    Tobacco Use   • Smoking status: Every Day     Packs/day: 1.50     Years: 44.00     Additional pack years: 0.00     Total pack  "years: 66.00     Types: Cigarettes   • Smokeless tobacco: Never   Vaping Use   • Vaping Use: Never used   Substance and Sexual Activity   • Alcohol use: Never   • Drug use: Never       Family History   Problem Relation Age of Onset   • Hypertension Father    • Heart attack Father    • Heart failure Father    • Diabetes Father    • Heart attack Sister    • Diabetes Sister    • Diabetes Brother    • Heart attack Brother    • Diabetes Brother    • Heart attack Brother    • Diabetes Paternal Grandmother        Review of Systems   Constitutional: Negative for decreased appetite and malaise/fatigue.   HENT:  Negative for congestion and sore throat.    Eyes:  Negative for blurred vision, double vision and visual disturbance.   Cardiovascular:  Positive for dyspnea on exertion. Negative for chest pain.   Respiratory:  Positive for shortness of breath. Negative for snoring.    Endocrine: Negative for cold intolerance and heat intolerance.   Hematologic/Lymphatic: Negative for adenopathy. Does not bruise/bleed easily.   Skin:  Negative for itching, nail changes and skin cancer.   Musculoskeletal:  Negative for arthritis and myalgias.   Gastrointestinal:  Negative for abdominal pain, dysphagia and heartburn.   Genitourinary:  Negative for bladder incontinence and frequency.   Neurological:  Negative for dizziness, seizures and vertigo.   Psychiatric/Behavioral:  Negative for altered mental status.    Allergic/Immunologic: Negative for environmental allergies and hives.     Diabetes- Yes  Thyroid- normal    Objective     /64 (BP Location: Left arm, Patient Position: Sitting)   Pulse 78   Ht 177.8 cm (70\")   Wt 125 kg (276 lb)   BMI 39.60 kg/m²     Vitals and nursing note reviewed.   Constitutional:       Appearance: Healthy appearance. Not in distress.   Eyes:      Conjunctiva/sclera: Conjunctivae normal.      Pupils: Pupils are equal, round, and reactive to light.   HENT:      Head: Normocephalic.   Pulmonary:      " Effort: Pulmonary effort is normal.      Breath sounds: Normal breath sounds.   Cardiovascular:      PMI at left midclavicular line. Normal rate. Regular rhythm.   Abdominal:      General: Bowel sounds are normal.      Palpations: Abdomen is soft.   Musculoskeletal: Normal range of motion.      Cervical back: Normal range of motion and neck supple. Skin:     General: Skin is warm and dry.   Neurological:      Mental Status: Alert, oriented to person, place, and time and oriented to person, place and time.   Procedures            @ASSESSMENT/PLAN@  Class 2 Severe Obesity (BMI >=35 and <=39.9). Obesity-related health conditions include the following: hypertension, diabetes mellitus, GERD, and lower extremity venous stasis disease. Obesity is unchanged. BMI is is above average; BMI management plan is completed. We discussed low calorie, low carb based diet program, portion control, and increasing exercise.     Diagnoses and all orders for this visit:    1. Primary hypertension (Primary)  -     Entresto 49-51 MG tablet; Take 1 tablet by mouth Every 12 (Twelve) Hours.  Dispense: 180 tablet; Refill: 3  -     metoprolol tartrate (LOPRESSOR) 50 MG tablet; Take 1 tablet by mouth 2 (Two) Times a Day.  Dispense: 180 tablet; Refill: 3  -     spironolactone (ALDACTONE) 25 MG tablet; Take 1 tablet by mouth Daily.  Dispense: 90 tablet; Refill: 3  -     Comprehensive Metabolic Panel; Future    2. Hypercholesterolemia  -     aspirin 81 MG EC tablet; Take 1 tablet by mouth Daily.  Dispense: 90 tablet; Refill: 3  -     rosuvastatin (CRESTOR) 20 MG tablet; Take 1 tablet by mouth Daily.  Dispense: 90 tablet; Refill: 3  -     Lipid Panel; Future    3. Smoking    4. Systolic dysfunction  -     Entresto 49-51 MG tablet; Take 1 tablet by mouth Every 12 (Twelve) Hours.  Dispense: 180 tablet; Refill: 3  -     dapagliflozin Propanediol 10 MG tablet; Take 10 mg by mouth Daily.  Dispense: 90 tablet; Refill: 3  -     proBNP; Future    5.  Cardiomyopathy, dilated  -     aspirin 81 MG EC tablet; Take 1 tablet by mouth Daily.  Dispense: 90 tablet; Refill: 3  -     Entresto 49-51 MG tablet; Take 1 tablet by mouth Every 12 (Twelve) Hours.  Dispense: 180 tablet; Refill: 3  -     metoprolol tartrate (LOPRESSOR) 50 MG tablet; Take 1 tablet by mouth 2 (Two) Times a Day.  Dispense: 180 tablet; Refill: 3  -     spironolactone (ALDACTONE) 25 MG tablet; Take 1 tablet by mouth Daily.  Dispense: 90 tablet; Refill: 3  -     dapagliflozin Propanediol 10 MG tablet; Take 10 mg by mouth Daily.  Dispense: 90 tablet; Refill: 3  -     TSH; Future  -     proBNP; Future    6. Morbid obesity with BMI of 40.0-44.9, adult  -     CBC & Differential; Future    7. Type 2 diabetes mellitus without complication, without long-term current use of insulin  -     dapagliflozin Propanediol 10 MG tablet; Take 10 mg by mouth Daily.  Dispense: 90 tablet; Refill: 3  -     Hemoglobin A1c; Future  -     TSH; Future       At baseline his heart rate is stable.  His blood pressure is normal.  His BMI is still around 40.  His clinical examination is otherwise unremarkable    Regarding his hypertension, it seems to be very well-controlled with a combination of Entresto, beta-blockers and Aldactone.  Continue the same.  He is advised to recheck his lab especially the potassium level and the renal function    Regarding his hypercholesterolemia, his LDL was still slightly elevated on Crestor dose was increased.  Need to recheck his profile and LFT    Regarding the systolic dysfunction and cardiomyopathy.  At this time we will continue the Entresto, beta-blockers and Aldactone.  I did add Farxiga 10 mg once a day.  He is advised to check his BNP level and if it is elevated will add low-dose of diuretic    Regarding his smoking, talked to him again about the smoking.  He is trying his best to quit smoking.  I gave him papers to help him quit    Regarding the obesity, he does have sleep apnea and is  using the CPAP more regularly.  I talked to him about diet and gave him papers on Mediterranean diet    Regarding his diabetes, he is on metformin and Glucotrol.  His blood sugar is still slightly elevated.  Hopefully the Farxiga will help reduce it.  Recheck his A1c level    I will see him back in 6 months or sooner if needed                  Electronically signed by Miki Padron MD December 7, 2023 12:58 EST

## 2024-01-30 ENCOUNTER — TELEPHONE (OUTPATIENT)
Dept: CARDIOLOGY | Facility: CLINIC | Age: 54
End: 2024-01-30
Payer: COMMERCIAL

## 2024-01-30 NOTE — TELEPHONE ENCOUNTER
Received fax from Dr. Thapa for cardiac clearance for patient to have a lap jaci under general anesthesia. Patient is on aspirin, unclear if needing to hold. According to our records, I do not see where patient has had any stenting.          Fax [phone]

## 2024-06-12 ENCOUNTER — OFFICE VISIT (OUTPATIENT)
Dept: CARDIOLOGY | Facility: CLINIC | Age: 54
End: 2024-06-12
Payer: COMMERCIAL

## 2024-06-12 VITALS
HEIGHT: 70 IN | SYSTOLIC BLOOD PRESSURE: 106 MMHG | DIASTOLIC BLOOD PRESSURE: 62 MMHG | WEIGHT: 253.2 LBS | HEART RATE: 72 BPM | BODY MASS INDEX: 36.25 KG/M2

## 2024-06-12 DIAGNOSIS — G47.33 OSA ON CPAP: ICD-10-CM

## 2024-06-12 DIAGNOSIS — I51.9 SYSTOLIC DYSFUNCTION: ICD-10-CM

## 2024-06-12 DIAGNOSIS — F17.200 SMOKING: ICD-10-CM

## 2024-06-12 DIAGNOSIS — I42.0 CARDIOMYOPATHY, DILATED: Primary | ICD-10-CM

## 2024-06-12 DIAGNOSIS — Z79.899 MEDICATION MANAGEMENT: ICD-10-CM

## 2024-06-12 DIAGNOSIS — E78.00 HYPERCHOLESTEROLEMIA: ICD-10-CM

## 2024-06-12 DIAGNOSIS — E11.9 TYPE 2 DIABETES MELLITUS WITHOUT COMPLICATION, WITHOUT LONG-TERM CURRENT USE OF INSULIN: ICD-10-CM

## 2024-06-12 DIAGNOSIS — E66.01 MORBID OBESITY WITH BMI OF 40.0-44.9, ADULT: ICD-10-CM

## 2024-06-12 DIAGNOSIS — I10 PRIMARY HYPERTENSION: ICD-10-CM

## 2024-06-12 NOTE — PROGRESS NOTES
Chief Complaint   Patient presents with    Follow-up     Cardiac management    LABS     February 2024 results in chart    Med Refill     Request refills on all medications 90 day supply  sent to [facility] pharmacy       Subjective       Fercho Mas is a 53 y.o. male with HTN, DM, hypercholesterolemia.  Initially seen for CP and SOA.  Echocardiogram showed slightly diminished LV function, WMA involving apex and septum.  Stress test showed apical infarct with ischemia.  Carotid ultrasound was negative.  Cardiac catheterization revealed normal coronaries and mild LV dysfunction.  Hyzaar changed to Entresto, Aldactone added.  Advised to use CPAP regularly.    Today he returns the office for a follow-up visit.  He admits to using the CPAP routinely.  Smoking has decreased to 1 pack or less per day.  He continues to have fatigue but denies being worse and admits to a very active occupation which she feels contributes to the fatigue/tiredness.  His weight is down approximately 20 pounds since last visit and tolerating Mounjaro well.  No chest pain, palpitations, edema, dizziness or increase shortness of breath noted.      Cardiac History:    Past Surgical History:   Procedure Laterality Date    CARDIAC CATHETERIZATION  10/12/2023    Normal Coronaries. EF 45%    CARDIOVASCULAR STRESS TEST  09/14/2023    Lexiscan- EF 46%. Apical Infarct with Ischemia    ECHO - CONVERTED  09/14/2023    TLS. EF 45-50%. Apical WMA. LA- 3.9. Trace-Mild MR    US CAROTID UNILATERAL  09/14/2023    No sig lesions       Current Outpatient Medications   Medication Sig Dispense Refill    aspirin 81 MG EC tablet Take 1 tablet by mouth Daily. 90 tablet 3    Capsicum, Cayenne, (CAYENNE PEPPER PO) Take 400 mg by mouth Daily.      Cholecalciferol (Vitamin D-3) 125 MCG (5000 UT) tablet Take 1 tablet by mouth Daily.      Cyanocobalamin (B-12 PO) Take 5,000 mg by mouth Daily.      dapagliflozin Propanediol 10 MG tablet Take 10 mg by mouth Daily. 90  tablet 3    Entresto 49-51 MG tablet Take 1 tablet by mouth Every 12 (Twelve) Hours. 180 tablet 3    glipizide (GLUCOTROL) 10 MG tablet Take 1 tablet by mouth 2 (Two) Times a Day Before Meals.      metFORMIN (GLUCOPHAGE) 500 MG tablet Take 2 tablets by mouth 2 (Two) Times a Day With Meals.      metoprolol tartrate (LOPRESSOR) 50 MG tablet Take 1 tablet by mouth 2 (Two) Times a Day. 180 tablet 3    Omega 3-6-9 Fatty Acids (OMEGA 3-6-9 PO) Take 2 capsules by mouth Daily.      Prasterone, DHEA, (DHEA PO) Take 150 mg by mouth Daily.      rOPINIRole (REQUIP) 1 MG tablet Take 1 tablet by mouth Every Night. Take 1 hour before bedtime.      rosuvastatin (CRESTOR) 20 MG tablet Take 1 tablet by mouth Daily. 90 tablet 3    spironolactone (ALDACTONE) 25 MG tablet Take 1 tablet by mouth Daily. 90 tablet 3    Tirzepatide (MOUNJARO) 7.5 MG/0.5ML solution pen-injector pen Inject 0.5 mL under the skin into the appropriate area as directed 1 (One) Time Per Week.      venlafaxine (EFFEXOR) 50 MG tablet Take 1 tablet by mouth 2 (Two) Times a Day With Meals.       No current facility-administered medications for this visit.       Patient has no known allergies.    Past Medical History:   Diagnosis Date    COPD (chronic obstructive pulmonary disease)     Deep vein thrombosis     Diabetes mellitus     Gall stones 2023    Hernia, abdominal     History of cholecystectomy 02/2024    Hyperlipidemia     Hypertension     Restless legs     Sleep apnea        Social History     Socioeconomic History    Marital status:    Tobacco Use    Smoking status: Every Day     Current packs/day: 1.50     Average packs/day: 1.5 packs/day for 44.0 years (66.0 ttl pk-yrs)     Types: Cigarettes    Smokeless tobacco: Never   Vaping Use    Vaping status: Never Used   Substance and Sexual Activity    Alcohol use: Never    Drug use: Never       Family History   Problem Relation Age of Onset    Hypertension Father     Heart attack Father     Heart failure  "Father     Diabetes Father     Heart attack Sister     Diabetes Sister     Diabetes Brother     Heart attack Brother     Diabetes Brother     Heart attack Brother     Diabetes Paternal Grandmother        Review of Systems   Constitutional: Positive for malaise/fatigue. Negative for diaphoresis.   Cardiovascular:  Negative for chest pain, dyspnea on exertion, irregular heartbeat, leg swelling, near-syncope and palpitations.   Respiratory:  Positive for shortness of breath (No worse) and sleep disturbances due to breathing (Uses CPAP with benefit).    Hematologic/Lymphatic: Negative for bleeding problem.   Gastrointestinal:  Negative for nausea.   Neurological:  Negative for dizziness and weakness.        BP Readings from Last 5 Encounters:   06/12/24 106/62   12/07/23 104/64   08/28/23 102/58       Wt Readings from Last 5 Encounters:   06/12/24 115 kg (253 lb 3.2 oz)   12/07/23 125 kg (276 lb)   08/28/23 127 kg (279 lb 6.4 oz)       Objective     Labs 2/21/2024 per PCP: A1c 7.5, total cholesterol 118, triglycerides 94, HDL 31, LDL 68, PSA 0.3, WBC 9.8, RBC 5.25, H&H 16.5 and 48.3, platelet count 211, glucose 120, BUN 17, creatinine 1.15, GFR greater than 60, sodium 138, potassium 4.5, chloride 104, carbon oxide 29, calcium 9.6, total protein 7.3, albumin 4.3, total bili 0.4, AST 18, ALT 22, ALP 52    /62 (BP Location: Left arm, Patient Position: Sitting)   Pulse 72   Ht 177.8 cm (70\")   Wt 115 kg (253 lb 3.2 oz)   BMI 36.33 kg/m²     Vitals and nursing note reviewed.   Constitutional:       Appearance: Not in distress.   Eyes:      Conjunctiva/sclera: Conjunctivae normal.      Pupils: Pupils are equal, round, and reactive to light.   HENT:      Head: Normocephalic.   Pulmonary:      Effort: Pulmonary effort is normal.      Breath sounds: Normal breath sounds. Decreased air movement present. No wheezing. No rales.   Cardiovascular:      PMI at left midclavicular line. Normal rate. Regular rhythm.      " Murmurs: There is no murmur.   Edema:     Peripheral edema absent.   Abdominal:      General: Bowel sounds are normal.      Palpations: Abdomen is soft.   Musculoskeletal:      Cervical back: Neck supple. Skin:     General: Skin is warm and dry.   Neurological:      Mental Status: Alert, oriented to person, place, and time and oriented to person, place and time.          Procedures: None today         Assessment & Plan   Diagnoses and all orders for this visit:    1. Cardiomyopathy, dilated (Primary)  -     Adult Transthoracic Echo Complete W/ Cont if Necessary Per Protocol; Future    2. Systolic dysfunction  -     Adult Transthoracic Echo Complete W/ Cont if Necessary Per Protocol; Future    3. Primary hypertension    4. Hypercholesterolemia    5. Smoking    6. Morbid obesity with BMI of 40.0-44.9, adult    7. Type 2 diabetes mellitus without complication, without long-term current use of insulin    8. YENI on CPAP    9. Medication management  -     Adult Transthoracic Echo Complete W/ Cont if Necessary Per Protocol; Future      Dilated cardiomyopathy/systolic dysfunction  -2023 cardiac catheterization: EF 45%, WMA most likely prior MI.  No significant stenosis and no intervention required  -Repeat echocardiogram ordered for surveillance of cardiac function/medication management effectiveness  -Continue Aldactone, metoprolol tartrate, Entresto, Farxiga  -Continue compliance with CPAP therapy for management of YENI    Hypertension  -Well-managed, continue current plan of care  -Continue Dash diet, weight loss    Hypercholesterolemia  -Continue Crestor    Smoking  -Admits to decreased number per day  -Continue efforts toward smoking cessation  -Consider LDCT for pulmonary screening, patient will further discuss with you    Obesity  -BMI improved to 36  -Tolerating Mounjaro well    DM  -A1c improved  -Will follow with your office for monitoring/management    6-month follow-up visit scheduled.             Electronically  signed by Loida Russell, APRN,  June 12, 2024 10:01 EDT    Dictated Utilizing Dragon Dictation: Part of this note may be an electronic transcription/translation of spoken language to printed text using the Dragon Dictation System.

## 2024-06-20 ENCOUNTER — HOSPITAL ENCOUNTER (OUTPATIENT)
Dept: CARDIOLOGY | Facility: HOSPITAL | Age: 54
Discharge: HOME | End: 2024-06-20
Admitting: NURSE PRACTITIONER
Payer: COMMERCIAL

## 2024-06-20 VITALS — HEIGHT: 70 IN | WEIGHT: 253.53 LBS | BODY MASS INDEX: 36.3 KG/M2

## 2024-06-20 DIAGNOSIS — I51.9 SYSTOLIC DYSFUNCTION: ICD-10-CM

## 2024-06-20 DIAGNOSIS — I42.0 CARDIOMYOPATHY, DILATED: ICD-10-CM

## 2024-06-20 DIAGNOSIS — Z79.899 MEDICATION MANAGEMENT: ICD-10-CM

## 2024-06-20 LAB
AORTIC DIMENSIONLESS INDEX: 0.58 (DI)
BH CV ECHO MEAS - AO MAX PG: 8.5 MMHG
BH CV ECHO MEAS - AO MEAN PG: 4.7 MMHG
BH CV ECHO MEAS - AO ROOT DIAM: 3.1 CM
BH CV ECHO MEAS - AO V2 MAX: 145.9 CM/SEC
BH CV ECHO MEAS - AO V2 VTI: 34.1 CM
BH CV ECHO MEAS - AVA(I,D): 2.8 CM2
BH CV ECHO MEAS - EDV(CUBED): 185.5 ML
BH CV ECHO MEAS - EF_3D-VOL: 55 %
BH CV ECHO MEAS - ESV(CUBED): 70.8 ML
BH CV ECHO MEAS - FS: 27.5 %
BH CV ECHO MEAS - IVS/LVPW: 1.03 CM
BH CV ECHO MEAS - IVSD: 1.1 CM
BH CV ECHO MEAS - LA DIMENSION: 4.2 CM
BH CV ECHO MEAS - LAT PEAK E' VEL: 9.4 CM/SEC
BH CV ECHO MEAS - LV MASS(C)D: 253.3 GRAMS
BH CV ECHO MEAS - LV MAX PG: 2.5 MMHG
BH CV ECHO MEAS - LV MEAN PG: 1.28 MMHG
BH CV ECHO MEAS - LV V1 MAX: 79.3 CM/SEC
BH CV ECHO MEAS - LV V1 VTI: 19.9 CM
BH CV ECHO MEAS - LVIDD: 5.7 CM
BH CV ECHO MEAS - LVIDS: 4.1 CM
BH CV ECHO MEAS - LVOT AREA: 4.8 CM2
BH CV ECHO MEAS - LVOT DIAM: 2.48 CM
BH CV ECHO MEAS - LVPWD: 1.07 CM
BH CV ECHO MEAS - MED PEAK E' VEL: 7.3 CM/SEC
BH CV ECHO MEAS - MV A MAX VEL: 68.3 CM/SEC
BH CV ECHO MEAS - MV DEC SLOPE: 377.5 CM/SEC2
BH CV ECHO MEAS - MV DEC TIME: 0.21 SEC
BH CV ECHO MEAS - MV E MAX VEL: 102 CM/SEC
BH CV ECHO MEAS - MV E/A: 1.49
BH CV ECHO MEAS - MV MAX PG: 4.1 MMHG
BH CV ECHO MEAS - MV MEAN PG: 1.74 MMHG
BH CV ECHO MEAS - MV P1/2T: 77.2 MSEC
BH CV ECHO MEAS - MV V2 VTI: 32.7 CM
BH CV ECHO MEAS - MVA(P1/2T): 2.8 CM2
BH CV ECHO MEAS - MVA(VTI): 2.9 CM2
BH CV ECHO MEAS - PA V2 MAX: 87.9 CM/SEC
BH CV ECHO MEAS - RV MAX PG: 1.06 MMHG
BH CV ECHO MEAS - RV V1 MAX: 51.5 CM/SEC
BH CV ECHO MEAS - RV V1 VTI: 11.3 CM
BH CV ECHO MEAS - RVDD: 3 CM
BH CV ECHO MEAS - SV(LVOT): 96.3 ML
BH CV ECHO MEAS - SVI(LVOT): 41.8 ML/M2
BH CV ECHO MEAS - TAPSE (>1.6): 2.7 CM
BH CV ECHO MEASUREMENTS AVERAGE E/E' RATIO: 12.22
BH CV XLRA - TDI S': 10.7 CM/SEC
SINUS: 3.1 CM

## 2024-06-20 PROCEDURE — 93306 TTE W/DOPPLER COMPLETE: CPT

## 2024-09-06 RX ORDER — ISOSORBIDE MONONITRATE 30 MG/1
30 TABLET, EXTENDED RELEASE ORAL EVERY MORNING
Qty: 30 TABLET | Refills: 11 | OUTPATIENT
Start: 2024-09-06

## 2024-12-23 DIAGNOSIS — I10 PRIMARY HYPERTENSION: ICD-10-CM

## 2024-12-23 DIAGNOSIS — E11.9 TYPE 2 DIABETES MELLITUS WITHOUT COMPLICATION, WITHOUT LONG-TERM CURRENT USE OF INSULIN: ICD-10-CM

## 2024-12-23 DIAGNOSIS — E78.00 HYPERCHOLESTEROLEMIA: ICD-10-CM

## 2024-12-23 DIAGNOSIS — I51.9 SYSTOLIC DYSFUNCTION: ICD-10-CM

## 2024-12-23 DIAGNOSIS — I42.0 CARDIOMYOPATHY, DILATED: ICD-10-CM

## 2024-12-24 RX ORDER — METOPROLOL TARTRATE 50 MG
50 TABLET ORAL 2 TIMES DAILY
Qty: 180 TABLET | Refills: 3 | Status: SHIPPED | OUTPATIENT
Start: 2024-12-24

## 2024-12-24 RX ORDER — ASPIRIN 81 MG/1
81 TABLET, COATED ORAL DAILY
Qty: 90 TABLET | Refills: 3 | Status: SHIPPED | OUTPATIENT
Start: 2024-12-24

## 2024-12-24 RX ORDER — ROSUVASTATIN CALCIUM 20 MG/1
20 TABLET, COATED ORAL DAILY
Qty: 90 TABLET | Refills: 3 | Status: SHIPPED | OUTPATIENT
Start: 2024-12-24

## 2024-12-24 RX ORDER — SPIRONOLACTONE 25 MG/1
25 TABLET ORAL DAILY
Qty: 90 TABLET | Refills: 3 | Status: SHIPPED | OUTPATIENT
Start: 2024-12-24

## 2024-12-24 RX ORDER — DAPAGLIFLOZIN 10 MG/1
1 TABLET, FILM COATED ORAL DAILY
Qty: 90 TABLET | Refills: 3 | Status: SHIPPED | OUTPATIENT
Start: 2024-12-24